# Patient Record
Sex: FEMALE | Race: WHITE | NOT HISPANIC OR LATINO | Employment: FULL TIME | URBAN - METROPOLITAN AREA
[De-identification: names, ages, dates, MRNs, and addresses within clinical notes are randomized per-mention and may not be internally consistent; named-entity substitution may affect disease eponyms.]

---

## 2024-03-11 ENCOUNTER — OFFICE VISIT (OUTPATIENT)
Dept: URGENT CARE | Facility: CLINIC | Age: 39
End: 2024-03-11
Payer: COMMERCIAL

## 2024-03-11 ENCOUNTER — APPOINTMENT (OUTPATIENT)
Dept: URGENT CARE | Facility: CLINIC | Age: 39
End: 2024-03-11
Payer: COMMERCIAL

## 2024-03-11 VITALS
BODY MASS INDEX: 29.82 KG/M2 | DIASTOLIC BLOOD PRESSURE: 70 MMHG | RESPIRATION RATE: 16 BRPM | HEIGHT: 65 IN | HEART RATE: 101 BPM | OXYGEN SATURATION: 98 % | SYSTOLIC BLOOD PRESSURE: 110 MMHG | TEMPERATURE: 97.2 F | WEIGHT: 179 LBS

## 2024-03-11 DIAGNOSIS — B96.89 ACUTE BACTERIAL SINUSITIS: ICD-10-CM

## 2024-03-11 DIAGNOSIS — J01.90 ACUTE BACTERIAL SINUSITIS: ICD-10-CM

## 2024-03-11 DIAGNOSIS — D84.9 IMMUNOCOMPROMISED (HCC): ICD-10-CM

## 2024-03-11 DIAGNOSIS — H66.91 RIGHT ACUTE OTITIS MEDIA: ICD-10-CM

## 2024-03-11 PROCEDURE — 3074F SYST BP LT 130 MM HG: CPT | Performed by: NURSE PRACTITIONER

## 2024-03-11 PROCEDURE — 99203 OFFICE O/P NEW LOW 30 MIN: CPT | Performed by: NURSE PRACTITIONER

## 2024-03-11 PROCEDURE — 3078F DIAST BP <80 MM HG: CPT | Performed by: NURSE PRACTITIONER

## 2024-03-11 RX ORDER — AZATHIOPRINE 50 MG
TABLET ORAL
COMMUNITY
Start: 2022-04-01

## 2024-03-11 RX ORDER — PHENTERMINE HYDROCHLORIDE 37.5 MG/1
TABLET ORAL
COMMUNITY
Start: 2024-01-22

## 2024-03-11 RX ORDER — BUPROPION HCL 300 MG
1 TABLET, EXTENDED RELEASE 24 HR ORAL EVERY MORNING
COMMUNITY
Start: 2024-01-22

## 2024-03-11 RX ORDER — CEFUROXIME AXETIL 250 MG/1
250 TABLET ORAL 2 TIMES DAILY
Qty: 28 TABLET | Refills: 0 | Status: SHIPPED | OUTPATIENT
Start: 2024-03-11 | End: 2024-03-25

## 2024-03-11 RX ORDER — BUPROPION HYDROCHLORIDE 150 MG/1
TABLET ORAL
COMMUNITY

## 2024-03-11 RX ORDER — CETIRIZINE HYDROCHLORIDE 10 MG/1
1 TABLET ORAL
COMMUNITY

## 2024-03-11 RX ORDER — PREDNISONE 20 MG/1
TABLET ORAL
Qty: 10 TABLET | Refills: 0 | Status: SHIPPED | OUTPATIENT
Start: 2024-03-11

## 2024-03-11 RX ORDER — AMOXICILLIN AND CLAVULANATE POTASSIUM 875; 125 MG/1; MG/1
1 TABLET, FILM COATED ORAL EVERY 12 HOURS
COMMUNITY
Start: 2024-02-20 | End: 2024-03-11

## 2024-03-11 RX ORDER — ESCITALOPRAM OXALATE 20 MG/1
TABLET ORAL
COMMUNITY
Start: 2024-01-11

## 2024-03-11 ASSESSMENT — ENCOUNTER SYMPTOMS
EYE DISCHARGE: 0
SPUTUM PRODUCTION: 1
MYALGIAS: 0
SORE THROAT: 0
SHORTNESS OF BREATH: 0
ORTHOPNEA: 0
DIARRHEA: 0
CHILLS: 0
WHEEZING: 0
HEADACHES: 1
FEVER: 0
COUGH: 1
NAUSEA: 0

## 2024-03-11 NOTE — PROGRESS NOTES
Subjective     Ambrocio Marshall is a 38 y.o. female who presents with Otalgia (Rt ear pain x 3 days), Nasal Congestion (On-going, x 3 months), and Cough (On-going, x 3 months)            HPI  New problem.  Patient is a very pleasant 38-year-old female who presents with persistent nasal congestion and cough x 3 months.  She also has some right ear pain and fullness for the past couple of days.  She does have a history of seasonal allergies for which she takes allergy shots.  She has just finished a course of Augmentin with no improvement of her symptoms.  She does take daily Zyrtec.  She is here from out of town.  Of note she is immunocompromise due to the fact that she is on Remicade for her ulcerative colitis.    Penicillins  Current Outpatient Medications on File Prior to Visit   Medication Sig Dispense Refill    amoxicillin-clavulanate (AUGMENTIN) 875-125 MG Tab Take 1 Tablet by mouth every 12 hours.      IMURAN 50 MG Tab 1 tablet Orally twice daily      buPROPion (WELLBUTRIN XL) 150 MG XL tablet 90      WELLBUTRIN  MG XL tablet Take 1 Tablet by mouth every morning.      cetirizine (ZYRTEC ALLERGY) 10 MG Tab Take 1 Tablet by mouth every day.      escitalopram (LEXAPRO) 20 MG tablet 0      inFLIXimab (REMICADE IV)       phentermine (ADIPEX-P) 37.5 MG tablet 1 tablet before breakfast Orally Once a day for 30 days       No current facility-administered medications on file prior to visit.     Social History     Socioeconomic History    Marital status:      Spouse name: Not on file    Number of children: Not on file    Years of education: Not on file    Highest education level: Not on file   Occupational History    Not on file   Tobacco Use    Smoking status: Never    Smokeless tobacco: Never   Vaping Use    Vaping Use: Never used   Substance and Sexual Activity    Alcohol use: Yes    Drug use: Never    Sexual activity: Not on file   Other Topics Concern    Not on file   Social History Narrative    Not on  "file     Social Determinants of Health     Financial Resource Strain: Not on file   Food Insecurity: Not on file   Transportation Needs: Not on file   Physical Activity: Not on file   Stress: Not on file   Social Connections: Not on file   Intimate Partner Violence: Not on file   Housing Stability: Not on file     Breast Cancer-related family history is not on file.      Review of Systems   Constitutional:  Positive for malaise/fatigue. Negative for chills and fever.   HENT:  Positive for congestion and ear pain. Negative for sore throat.    Eyes:  Negative for discharge.   Respiratory:  Positive for cough and sputum production. Negative for shortness of breath and wheezing.    Cardiovascular:  Negative for chest pain and orthopnea.   Gastrointestinal:  Negative for diarrhea and nausea.   Musculoskeletal:  Negative for myalgias.   Neurological:  Positive for headaches.   Endo/Heme/Allergies:  Negative for environmental allergies.   All other systems reviewed and are negative.             Objective     /70 (BP Location: Right arm, Patient Position: Sitting, BP Cuff Size: Large adult)   Pulse (!) 101   Temp 36.2 °C (97.2 °F)   Resp 16   Ht 1.651 m (5' 5\")   Wt 81.2 kg (179 lb)   SpO2 98%   BMI 29.79 kg/m²      Physical Exam  Vitals and nursing note reviewed.   Constitutional:       General: She is not in acute distress.     Appearance: She is well-developed.   HENT:      Head: Normocephalic.      Right Ear: External ear normal. Tympanic membrane is injected and erythematous. Tympanic membrane is not bulging.      Left Ear: Tympanic membrane and external ear normal.      Nose: Mucosal edema and congestion present. No rhinorrhea.      Comments: Mucopurulent nasal discharge noted.     Mouth/Throat:      Pharynx: No posterior oropharyngeal erythema.   Eyes:      General:         Right eye: No discharge.         Left eye: No discharge.      Conjunctiva/sclera: Conjunctivae normal.   Cardiovascular:      Rate " and Rhythm: Normal rate and regular rhythm.      Heart sounds: Normal heart sounds.   Musculoskeletal:         General: Normal range of motion.      Cervical back: Normal range of motion and neck supple.   Lymphadenopathy:      Cervical: No cervical adenopathy.   Skin:     General: Skin is warm and dry.   Neurological:      Mental Status: She is alert and oriented to person, place, and time.   Psychiatric:         Behavior: Behavior normal.         Thought Content: Thought content normal.                             Assessment & Plan        1. Acute bacterial sinusitis  cefUROXime (CEFTIN) 250 MG Tab    predniSONE (DELTASONE) 20 MG Tab      2. Right acute otitis media        3. Immunocompromised (HCC)          Patient to be treated with 14 day course of ceftin (immunocompromised).  Prednisone.  Flonase with zyrtec.  Differential diagnosis, natural history, supportive care, and indications for immediate follow-up were discussed.